# Patient Record
Sex: FEMALE | Race: WHITE | NOT HISPANIC OR LATINO | Employment: OTHER | URBAN - METROPOLITAN AREA
[De-identification: names, ages, dates, MRNs, and addresses within clinical notes are randomized per-mention and may not be internally consistent; named-entity substitution may affect disease eponyms.]

---

## 2017-05-16 ENCOUNTER — HOSPITAL ENCOUNTER (OUTPATIENT)
Facility: MEDICAL CENTER | Age: 42
End: 2017-05-16
Attending: FAMILY MEDICINE
Payer: COMMERCIAL

## 2017-05-16 ENCOUNTER — OFFICE VISIT (OUTPATIENT)
Dept: MEDICAL GROUP | Facility: CLINIC | Age: 42
End: 2017-05-16
Payer: COMMERCIAL

## 2017-05-16 VITALS
RESPIRATION RATE: 16 BRPM | DIASTOLIC BLOOD PRESSURE: 70 MMHG | TEMPERATURE: 97.9 F | SYSTOLIC BLOOD PRESSURE: 104 MMHG | BODY MASS INDEX: 23.19 KG/M2 | WEIGHT: 126 LBS | HEART RATE: 60 BPM | HEIGHT: 62 IN | OXYGEN SATURATION: 94 %

## 2017-05-16 DIAGNOSIS — Z01.419 WELL WOMAN EXAM WITH ROUTINE GYNECOLOGICAL EXAM: ICD-10-CM

## 2017-05-16 DIAGNOSIS — Z00.00 LABORATORY EXAMINATION ORDERED AS PART OF A ROUTINE GENERAL MEDICAL EXAMINATION: ICD-10-CM

## 2017-05-16 DIAGNOSIS — Z12.39 SCREENING BREAST EXAMINATION: ICD-10-CM

## 2017-05-16 PROCEDURE — 99000 SPECIMEN HANDLING OFFICE-LAB: CPT | Performed by: FAMILY MEDICINE

## 2017-05-16 PROCEDURE — 88175 CYTOPATH C/V AUTO FLUID REDO: CPT

## 2017-05-16 PROCEDURE — 87624 HPV HI-RISK TYP POOLED RSLT: CPT

## 2017-05-16 PROCEDURE — 99396 PREV VISIT EST AGE 40-64: CPT | Performed by: FAMILY MEDICINE

## 2017-05-16 ASSESSMENT — ENCOUNTER SYMPTOMS
BACK PAIN: 0
INSOMNIA: 1
ABDOMINAL PAIN: 0
SENSORY CHANGE: 0
NERVOUS/ANXIOUS: 1
SEIZURES: 0
HEADACHES: 0
BLURRED VISION: 0
HALLUCINATIONS: 0
FOCAL WEAKNESS: 0
PALPITATIONS: 0
SHORTNESS OF BREATH: 0
VOMITING: 0
HEMOPTYSIS: 0
BRUISES/BLEEDS EASILY: 0
DOUBLE VISION: 0
HEARTBURN: 0
MYALGIAS: 0
ORTHOPNEA: 0
NECK PAIN: 0
LOSS OF CONSCIOUSNESS: 0
CLAUDICATION: 0
NAUSEA: 0
BLOOD IN STOOL: 0
DIZZINESS: 0
DEPRESSION: 0
WHEEZING: 0
COUGH: 0
MEMORY LOSS: 0
TINGLING: 0
TREMORS: 0
PND: 0
SPEECH CHANGE: 0
EYE PAIN: 0

## 2017-05-16 ASSESSMENT — PATIENT HEALTH QUESTIONNAIRE - PHQ9: CLINICAL INTERPRETATION OF PHQ2 SCORE: 0

## 2017-05-16 ASSESSMENT — LIFESTYLE VARIABLES: SUBSTANCE_ABUSE: 0

## 2017-05-16 NOTE — PROGRESS NOTES
Subjective:      Mary Jane Hoffman is a 41 y.o. female who presents with Gynecologic Exam        She is here for her well woman examination with pap.  She has generally been well.    Gynecologic Exam  Pertinent negatives include no abdominal pain, back pain, dysuria, frequency, headaches, joint pain, nausea, rash, urgency or vomiting.    has a past medical history of Restless leg syndrome and Vertigo. She also has no past medical history of Clotting disorder (CMS-HCC) or Diabetes.   has past surgical history that includes other; tubal ligation; mammoplasty augmentation; and enlarge breast with implant.  family history includes Cancer in her father and mother.   reports that she has never smoked. She has never used smokeless tobacco. She reports that she drinks about 1.2 - 1.8 oz of alcohol per week. She reports that she does not use illicit drugs.      Current outpatient prescriptions:   •  fluticasone (FLONASE) 50 MCG/ACT nasal spray, Spray 1 Spray in nose every day., Disp: 16 g, Rfl: 11  •  ibuprofen (MOTRIN) 600 MG Tab, Take 600 mg by mouth every 6 hours as needed. AS NEEDED FOR MODERATE PAIN, Disp: , Rfl: 0  •  Multiple Vitamin (MULTIVITAMIN PO), Take 1 Tab by mouth every day., Disp: , Rfl:   •  B Complex-C (SUPER B COMPLEX PO), Take 1 Tbsp by mouth every day., Disp: , Rfl:   •  Cholecalciferol (VITAMIN D3) 1000 UNIT TABS, Take 5,000 mg by mouth every day., Disp: , Rfl:   •  Multiple Minerals (MINERAL COMPLEX PO), Take 1 Tab by mouth every day., Disp: , Rfl:   is allergic to pcn.      Review of Systems   Constitutional: Negative for malaise/fatigue.   HENT: Positive for congestion. Negative for hearing loss.    Eyes: Negative for blurred vision, double vision and pain.   Respiratory: Negative for cough, hemoptysis, shortness of breath and wheezing.    Cardiovascular: Negative for chest pain, palpitations, orthopnea, claudication and PND.   Gastrointestinal: Negative for heartburn, nausea, vomiting, abdominal pain  "and blood in stool.   Genitourinary: Negative for dysuria, urgency and frequency.   Musculoskeletal: Negative for myalgias, back pain, joint pain and neck pain.   Skin: Negative for rash.   Neurological: Negative for dizziness, tingling, tremors, sensory change, speech change, focal weakness, seizures, loss of consciousness and headaches.        Restless leg syndrome, intermittent   Endo/Heme/Allergies: Positive for environmental allergies. Does not bruise/bleed easily.   Psychiatric/Behavioral: Negative for depression, suicidal ideas, hallucinations, memory loss and substance abuse. The patient is nervous/anxious and has insomnia.           Objective:     /70 mmHg  Pulse 60  Temp(Src) 36.6 °C (97.9 °F)  Resp 16  Ht 1.575 m (5' 2.01\")  Wt 57.153 kg (126 lb)  BMI 23.04 kg/m2  SpO2 94%  LMP 05/08/2017     Physical Exam   Constitutional: She is oriented to person, place, and time. She appears well-developed and well-nourished. No distress.   HENT:   Head: Normocephalic and atraumatic.   Mouth/Throat: Oropharynx is clear and moist.   Eyes: EOM are normal. Pupils are equal, round, and reactive to light. Left eye exhibits no discharge.   Neck: Normal range of motion. Neck supple. No JVD present. No thyromegaly present.   Cardiovascular: Normal rate, regular rhythm and normal heart sounds.    No murmur heard.  Pulmonary/Chest: Effort normal and breath sounds normal. No respiratory distress. She has no wheezes. She has no rales. She exhibits no edema, no deformity and no retraction. Right breast exhibits no inverted nipple, no mass, no nipple discharge, no skin change and no tenderness. Left breast exhibits no inverted nipple, no mass, no nipple discharge, no skin change and no tenderness. Breasts are symmetrical.   Implants present, no hardening or folding noted   Abdominal: Soft. Bowel sounds are normal. She exhibits no distension and no mass. There is no tenderness.   Genitourinary: Vagina normal and uterus " normal. No breast tenderness, discharge or bleeding. Cervix exhibits no discharge. Right adnexum displays no mass. Left adnexum displays no mass. No vaginal discharge found.   Pap smear (brush and spatula) taken and sent   Musculoskeletal: Normal range of motion. She exhibits no edema.   Lymphadenopathy:     She has no cervical adenopathy.   Neurological: She is alert and oriented to person, place, and time. Coordination normal.   Skin: Skin is warm and dry. No rash noted. No erythema.   Psychiatric: She has a normal mood and affect. Her behavior is normal.   Vitals reviewed.              Assessment/Plan:     1. Well woman exam with routine gynecological exam  Await pap result.  She is generally very well.  - THINPREP PAP WITH HPV; Future    2. Laboratory examination ordered as part of a routine general medical examination  Routine orders discussed and placed  - COMP METABOLIC PANEL; Future  - LIPID PROFILE; Future  - CBC WITHOUT DIFFERENTIAL; Future    3. Screening breast examination  Mammogram recommended  - MA-SCREEN MAMMO W/IMPLANTS W/CAD-BILAT; Future

## 2017-05-17 LAB
CYTOLOGY REG CYTOL: NORMAL
HPV HR 12 DNA CVX QL NAA+PROBE: NEGATIVE
HPV16 DNA SPEC QL NAA+PROBE: NEGATIVE
HPV18 DNA SPEC QL NAA+PROBE: NEGATIVE
SPECIMEN SOURCE: NORMAL

## 2017-06-23 ENCOUNTER — PATIENT MESSAGE (OUTPATIENT)
Dept: MEDICAL GROUP | Facility: CLINIC | Age: 42
End: 2017-06-23

## 2017-07-08 ENCOUNTER — PATIENT MESSAGE (OUTPATIENT)
Dept: MEDICAL GROUP | Facility: CLINIC | Age: 42
End: 2017-07-08

## 2017-07-11 ENCOUNTER — PATIENT MESSAGE (OUTPATIENT)
Dept: MEDICAL GROUP | Facility: CLINIC | Age: 42
End: 2017-07-11

## 2017-07-11 DIAGNOSIS — G89.29 CHRONIC PAIN OF LEFT KNEE: ICD-10-CM

## 2017-07-11 DIAGNOSIS — M25.562 CHRONIC PAIN OF LEFT KNEE: ICD-10-CM

## 2017-07-11 NOTE — TELEPHONE ENCOUNTER
From: Mary Jane Hoffman  To: Karla Peoples M.D.  Sent: 7/11/2017 6:52 AM PDT  Subject: RE:MRI order    Hello, my left knee. Thank you!  ----- Message -----  From: Karla Peoples M.D.  Sent: 7/10/2017 11:35 AM PDT  To: Mary Jane Hoffman  Subject: MRI order    I can place that order for you. Which knee is bothering you?

## 2017-10-24 ENCOUNTER — OFFICE VISIT (OUTPATIENT)
Dept: URGENT CARE | Facility: CLINIC | Age: 42
End: 2017-10-24
Payer: COMMERCIAL

## 2017-10-24 VITALS
DIASTOLIC BLOOD PRESSURE: 70 MMHG | WEIGHT: 133 LBS | HEART RATE: 86 BPM | BODY MASS INDEX: 23.57 KG/M2 | OXYGEN SATURATION: 98 % | TEMPERATURE: 98.6 F | SYSTOLIC BLOOD PRESSURE: 110 MMHG | RESPIRATION RATE: 20 BRPM | HEIGHT: 63 IN

## 2017-10-24 DIAGNOSIS — J06.9 VIRAL URI WITH COUGH: ICD-10-CM

## 2017-10-24 PROCEDURE — 99213 OFFICE O/P EST LOW 20 MIN: CPT | Performed by: NURSE PRACTITIONER

## 2017-10-24 RX ORDER — IBUPROFEN 800 MG/1
800 TABLET ORAL EVERY 8 HOURS PRN
Qty: 90 TAB | Refills: 1 | Status: SHIPPED | OUTPATIENT
Start: 2017-10-24 | End: 2019-04-01

## 2017-10-24 ASSESSMENT — ENCOUNTER SYMPTOMS
WHEEZING: 0
COUGH: 1
SPUTUM PRODUCTION: 1
DIARRHEA: 0
NAUSEA: 0
MYALGIAS: 1
FEVER: 0
CHILLS: 0
SORE THROAT: 0
HEADACHES: 1
ORTHOPNEA: 0
SHORTNESS OF BREATH: 0
EYE DISCHARGE: 0

## 2017-10-24 NOTE — PROGRESS NOTES
"Subjective:      Mary Jane Hoffman is a 41 y.o. female who presents with Nasal Congestion; Cough; Generalized Body Aches; and Fever            HPI New problem. 41 year old with nasal congestion, cough and body aches for 2 days. She states cough is productive with chest tightness. She denies shortness of breath or wheezing. States has fever (subjective) and sweats. She has taken OTC medication for this with no relief.  Allergies, medications and history reviewed by me today      Review of Systems   Constitutional: Positive for malaise/fatigue. Negative for chills and fever.   HENT: Positive for congestion. Negative for sore throat.    Eyes: Negative for discharge.   Respiratory: Positive for cough and sputum production. Negative for shortness of breath and wheezing.    Cardiovascular: Negative for chest pain and orthopnea.   Gastrointestinal: Negative for diarrhea and nausea.   Musculoskeletal: Positive for myalgias.   Neurological: Positive for headaches.   Endo/Heme/Allergies: Negative for environmental allergies.          Objective:     /70   Pulse 86   Temp 37 °C (98.6 °F)   Resp 20   Ht 1.6 m (5' 3\")   Wt 60.3 kg (133 lb)   SpO2 98%   BMI 23.56 kg/m²      Physical Exam   Constitutional: She is oriented to person, place, and time. She appears well-developed and well-nourished. No distress.   HENT:   Head: Normocephalic and atraumatic.   Right Ear: External ear and ear canal normal. Tympanic membrane is not injected and not perforated. No middle ear effusion.   Left Ear: External ear and ear canal normal. Tympanic membrane is not injected and not perforated.  No middle ear effusion.   Nose: Mucosal edema present.   Mouth/Throat: Posterior oropharyngeal erythema present. No oropharyngeal exudate.   Eyes: Conjunctivae are normal. Right eye exhibits no discharge. Left eye exhibits no discharge.   Neck: Normal range of motion. Neck supple.   Cardiovascular: Normal rate, regular rhythm and normal heart " sounds.    No murmur heard.  Pulmonary/Chest: Effort normal and breath sounds normal. No respiratory distress.   Musculoskeletal: Normal range of motion.   Normal movement of all 4 extremities.   Lymphadenopathy:     She has no cervical adenopathy.        Right: No supraclavicular adenopathy present.        Left: No supraclavicular adenopathy present.   Neurological: She is alert and oriented to person, place, and time. Gait normal.   Skin: Skin is warm and dry.   Psychiatric: She has a normal mood and affect. Her behavior is normal. Thought content normal.               Assessment/Plan:     1. Viral URI with cough  ibuprofen (MOTRIN) 800 MG Tab     Viral illness at this time with no indication for antibiotics. Reviewed with patient expected course of illness and also reviewed OTC medications that may be used for symptom relief. Follow up 7-10 days if not improving.  a

## 2018-01-27 ENCOUNTER — OFFICE VISIT (OUTPATIENT)
Dept: URGENT CARE | Facility: CLINIC | Age: 43
End: 2018-01-27
Payer: COMMERCIAL

## 2018-01-27 VITALS
HEIGHT: 62 IN | OXYGEN SATURATION: 99 % | WEIGHT: 135.4 LBS | HEART RATE: 68 BPM | DIASTOLIC BLOOD PRESSURE: 60 MMHG | RESPIRATION RATE: 14 BRPM | TEMPERATURE: 98.5 F | BODY MASS INDEX: 24.92 KG/M2 | SYSTOLIC BLOOD PRESSURE: 100 MMHG

## 2018-01-27 DIAGNOSIS — R68.89 FLU-LIKE SYMPTOMS: ICD-10-CM

## 2018-01-27 DIAGNOSIS — R05.8 NON-PRODUCTIVE COUGH: ICD-10-CM

## 2018-01-27 LAB
FLUAV+FLUBV AG SPEC QL IA: NEGATIVE
INT CON NEG: NORMAL
INT CON POS: NORMAL

## 2018-01-27 PROCEDURE — 99214 OFFICE O/P EST MOD 30 MIN: CPT | Performed by: PHYSICIAN ASSISTANT

## 2018-01-27 PROCEDURE — 87804 INFLUENZA ASSAY W/OPTIC: CPT | Performed by: PHYSICIAN ASSISTANT

## 2018-01-27 RX ORDER — OSELTAMIVIR PHOSPHATE 75 MG/1
75 CAPSULE ORAL 2 TIMES DAILY
Qty: 10 CAP | Refills: 0 | Status: SHIPPED | OUTPATIENT
Start: 2018-01-27 | End: 2018-02-01

## 2018-01-27 RX ORDER — PROMETHAZINE HYDROCHLORIDE AND CODEINE PHOSPHATE 6.25; 1 MG/5ML; MG/5ML
5-10 SYRUP ORAL 3 TIMES DAILY PRN
Qty: 150 ML | Refills: 0 | Status: SHIPPED | OUTPATIENT
Start: 2018-01-27 | End: 2018-02-01

## 2018-01-27 RX ORDER — AZITHROMYCIN 250 MG/1
TABLET, FILM COATED ORAL
Qty: 6 TAB | Refills: 1 | Status: SHIPPED | OUTPATIENT
Start: 2018-01-27 | End: 2019-04-01

## 2018-01-27 ASSESSMENT — ENCOUNTER SYMPTOMS
COUGH: 1
EYES NEGATIVE: 1
SHORTNESS OF BREATH: 0
FEVER: 0
SPUTUM PRODUCTION: 0
CARDIOVASCULAR NEGATIVE: 1
CONSTITUTIONAL NEGATIVE: 1
SORE THROAT: 0
MYALGIAS: 1

## 2018-01-27 NOTE — PROGRESS NOTES
"Subjective:      Mary Jane Hoffman is a 42 y.o. female who presents with Cough (e0iclyp, cough, body aches, itchy throat)            Cough   This is a new (cough, flu sx) problem. The current episode started in the past 7 days. The problem has been unchanged. The problem occurs every few minutes. The cough is non-productive. Associated symptoms include myalgias. Pertinent negatives include no fever, sore throat or shortness of breath. Nothing aggravates the symptoms. She has tried nothing for the symptoms. The treatment provided no relief. There is no history of asthma or environmental allergies.       Review of Systems   Constitutional: Negative.  Negative for fever.   HENT: Negative.  Negative for sore throat.    Eyes: Negative.    Respiratory: Positive for cough. Negative for sputum production and shortness of breath.    Cardiovascular: Negative.    Musculoskeletal: Positive for myalgias.   Skin: Negative.    Endo/Heme/Allergies: Negative for environmental allergies.          Objective:     /60   Pulse 68   Temp 36.9 °C (98.5 °F)   Resp 14   Ht 1.575 m (5' 2\")   Wt 61.4 kg (135 lb 6.4 oz)   SpO2 99%   BMI 24.76 kg/m²      Physical Exam   Constitutional: She is oriented to person, place, and time. She appears well-developed and well-nourished. No distress.   HENT:   Head: Normocephalic and atraumatic.   Mouth/Throat: Oropharynx is clear and moist.   Eyes: EOM are normal. Pupils are equal, round, and reactive to light.   Neck: Normal range of motion. Neck supple.   Cardiovascular: Normal rate.    Pulmonary/Chest: Effort normal and breath sounds normal. No respiratory distress. She has no wheezes. She has no rales.   Lymphadenopathy:     She has cervical adenopathy.   Neurological: She is alert and oriented to person, place, and time.   Skin: Skin is warm and dry.   Psychiatric: She has a normal mood and affect. Her behavior is normal.   Nursing note and vitals reviewed.    Vitals:    01/27/18 1020   BP: " "100/60   Pulse: 68   Resp: 14   Temp: 36.9 °C (98.5 °F)   SpO2: 99%   Weight: 61.4 kg (135 lb 6.4 oz)   Height: 1.575 m (5' 2\")     Active Ambulatory Problems     Diagnosis Date Noted   • Restless leg syndrome    • Vertigo 09/10/2015   • Chronic pain of left knee 07/11/2017     Resolved Ambulatory Problems     Diagnosis Date Noted   • Panic disorder without agoraphobia      Past Medical History:   Diagnosis Date   • History of HPV infection    • Restless leg syndrome    • Vertigo      Current Outpatient Prescriptions on File Prior to Visit   Medication Sig Dispense Refill   • ibuprofen (MOTRIN) 800 MG Tab Take 1 Tab by mouth every 8 hours as needed. 90 Tab 1   • fluticasone (FLONASE) 50 MCG/ACT nasal spray Spray 1 Spray in nose every day. 16 g 11   • ibuprofen (MOTRIN) 600 MG Tab Take 600 mg by mouth every 6 hours as needed. AS NEEDED FOR MODERATE PAIN  0   • Multiple Vitamin (MULTIVITAMIN PO) Take 1 Tab by mouth every day.     • B Complex-C (SUPER B COMPLEX PO) Take 1 Tbsp by mouth every day.     • Cholecalciferol (VITAMIN D3) 1000 UNIT TABS Take 5,000 mg by mouth every day.     • Multiple Minerals (MINERAL COMPLEX PO) Take 1 Tab by mouth every day.       No current facility-administered medications on file prior to visit.      Family History   Problem Relation Age of Onset   • Cancer Mother      colon polyps,cervical   • Cancer Father      lymphoma     Family History   Problem Relation Age of Onset   • Cancer Mother      colon polyps,cervical   • Cancer Father      lymphoma     Pcn [penicillins]  Social History     Social History   • Marital status:      Spouse name: N/A   • Number of children: N/A   • Years of education: N/A     Occupational History   • Not on file.     Social History Main Topics   • Smoking status: Never Smoker   • Smokeless tobacco: Never Used   • Alcohol use 1.2 - 1.8 oz/week     2 - 3 Glasses of wine per week      Comment: social   • Drug use: No   • Sexual activity: Yes     Partners: " Male      Comment: ; 3 kids; massage therapy     Other Topics Concern   • Not on file     Social History Narrative   • No narrative on file             Flu ng     Assessment/Plan:     ·  flu sx, cough      · rx tamiflu [zpak prn cough persists]

## 2018-01-27 NOTE — LETTER
January 27, 2018         Patient: Mary Jane Hoffman   YOB: 1975   Date of Visit: 1/27/2018           To Whom it May Concern:    Mary Jane Hoffman was seen in my clinic on 1/27/2018 for illness; please excuse Saturday, Sunday.     If you have any questions or concerns, please don't hesitate to call.        Sincerely,           Agustín Holland P.A.-C.  Electronically Signed

## 2018-02-09 ENCOUNTER — APPOINTMENT (OUTPATIENT)
Dept: RADIOLOGY | Facility: MEDICAL CENTER | Age: 43
End: 2018-02-09
Attending: FAMILY MEDICINE
Payer: COMMERCIAL

## 2018-02-16 ENCOUNTER — HOSPITAL ENCOUNTER (OUTPATIENT)
Dept: RADIOLOGY | Facility: MEDICAL CENTER | Age: 43
End: 2018-02-16
Attending: FAMILY MEDICINE
Payer: COMMERCIAL

## 2018-02-16 DIAGNOSIS — Z12.31 VISIT FOR SCREENING MAMMOGRAM: ICD-10-CM

## 2018-02-16 PROCEDURE — 77067 SCR MAMMO BI INCL CAD: CPT

## 2018-03-23 ENCOUNTER — PATIENT MESSAGE (OUTPATIENT)
Dept: MEDICAL GROUP | Facility: CLINIC | Age: 43
End: 2018-03-23

## 2018-03-23 DIAGNOSIS — S99.929A: ICD-10-CM

## 2018-03-23 RX ORDER — IBUPROFEN 800 MG/1
800 TABLET ORAL EVERY 8 HOURS PRN
Qty: 30 TAB | Refills: 0 | Status: SHIPPED | OUTPATIENT
Start: 2018-03-23 | End: 2019-04-01

## 2018-09-10 ENCOUNTER — PATIENT MESSAGE (OUTPATIENT)
Dept: MEDICAL GROUP | Facility: CLINIC | Age: 43
End: 2018-09-10

## 2018-09-10 DIAGNOSIS — H10.31 ACUTE BACTERIAL CONJUNCTIVITIS OF RIGHT EYE: ICD-10-CM

## 2018-09-10 RX ORDER — SULFACETAMIDE SODIUM 100 MG/ML
1 SOLUTION/ DROPS OPHTHALMIC
Qty: 1 BOTTLE | Refills: 0 | Status: SHIPPED | OUTPATIENT
Start: 2018-09-10 | End: 2019-04-01

## 2019-04-01 ENCOUNTER — OFFICE VISIT (OUTPATIENT)
Dept: MEDICAL GROUP | Facility: MEDICAL CENTER | Age: 44
End: 2019-04-01

## 2019-04-01 VITALS
WEIGHT: 128 LBS | BODY MASS INDEX: 22.68 KG/M2 | SYSTOLIC BLOOD PRESSURE: 124 MMHG | DIASTOLIC BLOOD PRESSURE: 76 MMHG | OXYGEN SATURATION: 98 % | RESPIRATION RATE: 16 BRPM | HEIGHT: 63 IN | HEART RATE: 74 BPM | TEMPERATURE: 98.6 F

## 2019-04-01 DIAGNOSIS — F40.243 FLYING PHOBIA: ICD-10-CM

## 2019-04-01 DIAGNOSIS — Z12.31 ENCOUNTER FOR SCREENING MAMMOGRAM FOR MALIGNANT NEOPLASM OF BREAST: ICD-10-CM

## 2019-04-01 PROCEDURE — 99212 OFFICE O/P EST SF 10 MIN: CPT | Performed by: FAMILY MEDICINE

## 2019-04-01 RX ORDER — DIAZEPAM 5 MG/1
5 TABLET ORAL EVERY 12 HOURS PRN
Qty: 10 TAB | Refills: 0 | Status: SHIPPED | OUTPATIENT
Start: 2019-04-01 | End: 2020-09-22 | Stop reason: SDUPTHER

## 2019-04-01 ASSESSMENT — PATIENT HEALTH QUESTIONNAIRE - PHQ9: CLINICAL INTERPRETATION OF PHQ2 SCORE: 0

## 2019-04-01 NOTE — PROGRESS NOTES
"cc: Flying phobia    Subjective:     Mary Jane Hoffman is a 43 y.o. female presenting to discuss flying phobia and for a full physical exam.  She does not have health insurance.  She reports years of a phobia of flying.  Short flights are okay, but becomes quite anxious and irritable on longer flights.  She reports using Xanax about 4 years ago, but this seemed to give her vertigo.  Is wondering if she can try Valium.  She has a flight to [place] in [place] next week.  She rarely drinks alcohol.    Opioid Risk Score: 2    Interpretation of Opioid Risk Score   Score 0-3 = Low risk of abuse. Do UDS at least once per year.  Score 4-7 = Moderate risk of abuse. Do UDS 1-4 times per year.  Score 8+ = High risk of abuse. Refer to specialist.        Review of systems:  See above.       Current Outpatient Prescriptions:   •  diazePAM (VALIUM) 5 MG Tab, Take 1 Tab by mouth every 12 hours as needed for Anxiety (flying phobia) for up to 5 days., Disp: 10 Tab, Rfl: 0  •  Cholecalciferol (VITAMIN D3) 1000 UNIT TABS, Take 5,000 mg by mouth every day., Disp: , Rfl:     Allergies, past medical history, past surgical history, family history, social history reviewed and updated    Objective:     Vitals: /76 (BP Location: Right arm, Patient Position: Sitting)   Pulse 74   Temp 37 °C (98.6 °F) (Temporal)   Resp 16   Ht 1.6 m (5' 3\")   Wt 58.1 kg (128 lb)   LMP 03/04/2019   SpO2 98%   BMI 22.67 kg/m²   General: Alert, pleasant, NAD  HEENT: Normocephalic.  PERRL, EOMI, no icterus or pallor.  Conjunctivae and lids normal. External ears normal.Tympanic membranes pearly, opaque.  Oropharynx non-erythematous, mucous membranes moist.  Neck supple.  No thyromegaly or masses palpated. No cervical or supraclavicular lymphadenopathy.  Heart: Regular rate and rhythm.  S1 and S2 normal.  No murmurs appreciated.  Respiratory: Normal respiratory effort.  Clear to auscultation bilaterally.  Abdomen: Non-distended, soft  Skin: Warm, dry, " no rashes.  Musculoskeletal: Gait is normal.  Moves all extremities well.  Extremities: No leg edema.    Neurological: No tremors, sensation grossly intact, patellar reflexes 2+ symmetric, tone/strength normal  Psych:  Affect/mood is normal, judgement is good, memory is intact, grooming is appropriate.      Assessment/Plan:     Diagnoses and all orders for this visit:    Flying phobia  New problem.  I have reviewed the medical records and have determined that a controlled substance treatment is medically indicated.  Alternatives considered and discussed with patient.  Discussed risks of valium use.  ORT is 2.  NV and CA  checked, appropriate.  Script for 5 days given  -     diazePAM (VALIUM) 5 MG Tab; Take 1 Tab by mouth every 12 hours as needed for Anxiety (flying phobia) for up to 5 days. #10    Encounter for screening mammogram for malignant neoplasm of breast  -     MA-SCREEN MAMMO W/IMPLANTS W/CAD-BILAT; Future    We also discussed that she is not necessarily due for a full physical.  Her last Pap was 5/2017.  Recommended that she repeat it in 2020 or 2022 (as she does not have insurance today).  I reviewed other preventative health recommendations, she is only due for a mammogram.

## 2019-06-10 DIAGNOSIS — K59.09 OTHER CONSTIPATION: ICD-10-CM

## 2019-06-10 RX ORDER — LACTULOSE 10 G/15ML
20 SOLUTION ORAL 2 TIMES DAILY PRN
Qty: 1 BOTTLE | Refills: 0 | Status: SHIPPED | OUTPATIENT
Start: 2019-06-10 | End: 2020-09-22

## 2020-03-05 DIAGNOSIS — S99.929A: ICD-10-CM

## 2020-03-05 RX ORDER — IBUPROFEN 800 MG/1
800 TABLET ORAL EVERY 8 HOURS PRN
Qty: 30 TAB | Refills: 0 | Status: SHIPPED | OUTPATIENT
Start: 2020-03-05 | End: 2020-04-30 | Stop reason: SDUPTHER

## 2020-03-06 NOTE — TELEPHONE ENCOUNTER
VOICEMAIL  1. Caller Name: Mary Jane                      Call Back Number: [phone] (home) [phone] (work)    2. Message: Requesting refill of ibuprofen to [facility] [address] for gold elbow.     3. Patient approves office to leave a detailed voicemail/MyChart message: Yes

## 2020-04-30 DIAGNOSIS — S99.929A: ICD-10-CM

## 2020-04-30 RX ORDER — IBUPROFEN 800 MG/1
800 TABLET ORAL EVERY 8 HOURS PRN
Qty: 60 TAB | Refills: 2 | Status: SHIPPED | OUTPATIENT
Start: 2020-04-30 | End: 2020-09-22

## 2020-08-17 ENCOUNTER — TELEPHONE (OUTPATIENT)
Dept: SCHEDULING | Facility: IMAGING CENTER | Age: 45
End: 2020-08-17

## 2020-08-17 ENCOUNTER — HOSPITAL ENCOUNTER (OUTPATIENT)
Dept: LAB | Facility: MEDICAL CENTER | Age: 45
End: 2020-08-17
Attending: FAMILY MEDICINE

## 2020-08-17 DIAGNOSIS — Z86.16 HISTORY OF 2019 NOVEL CORONAVIRUS DISEASE (COVID-19): ICD-10-CM

## 2020-08-17 LAB
COVID ORDER STATUS COVID19: NORMAL
SARS-COV-2 RNA RESP QL NAA+PROBE: NOTDETECTED
SPECIMEN SOURCE: NORMAL

## 2020-08-17 PROCEDURE — U0003 INFECTIOUS AGENT DETECTION BY NUCLEIC ACID (DNA OR RNA); SEVERE ACUTE RESPIRATORY SYNDROME CORONAVIRUS 2 (SARS-COV-2) (CORONAVIRUS DISEASE [COVID-19]), AMPLIFIED PROBE TECHNIQUE, MAKING USE OF HIGH THROUGHPUT TECHNOLOGIES AS DESCRIBED BY CMS-2020-01-R: HCPCS

## 2020-08-17 PROCEDURE — C9803 HOPD COVID-19 SPEC COLLECT: HCPCS

## 2020-08-17 NOTE — TELEPHONE ENCOUNTER
I have placed a covid test order with [facility].  She needs to call the main number [phone] to get an urgent care appointment to test.

## 2020-08-17 NOTE — TELEPHONE ENCOUNTER
Good Morning Dr. Peoples,     This patient called in today requesting lab orders to get COVID tested in order to go back to work. Can you please review and follow up with the patient once the labs are in.    Thank you.

## 2020-08-28 ENCOUNTER — HOSPITAL ENCOUNTER (EMERGENCY)
Facility: MEDICAL CENTER | Age: 45
End: 2020-08-28
Attending: EMERGENCY MEDICINE

## 2020-08-28 ENCOUNTER — APPOINTMENT (OUTPATIENT)
Dept: URGENT CARE | Facility: CLINIC | Age: 45
End: 2020-08-28

## 2020-08-28 VITALS
TEMPERATURE: 97.9 F | BODY MASS INDEX: 23.12 KG/M2 | HEIGHT: 62 IN | RESPIRATION RATE: 14 BRPM | WEIGHT: 125.66 LBS | OXYGEN SATURATION: 100 % | DIASTOLIC BLOOD PRESSURE: 70 MMHG | HEART RATE: 66 BPM | SYSTOLIC BLOOD PRESSURE: 103 MMHG

## 2020-08-28 DIAGNOSIS — M25.542 ARTHRALGIA OF BOTH HANDS: ICD-10-CM

## 2020-08-28 DIAGNOSIS — M25.541 ARTHRALGIA OF BOTH HANDS: ICD-10-CM

## 2020-08-28 PROCEDURE — 99281 EMR DPT VST MAYX REQ PHY/QHP: CPT

## 2020-08-28 NOTE — ED PROVIDER NOTES
ED Provider Note    Scribed for Zac Ortiz M.D. by George Santiago. 8/28/2020, 8:59 AM.    Primary care provider: Stephen Peoples M.D.  Means of arrival: Walk In  History obtained from: Patient  History limited by: None    CHIEF COMPLAINT  Chief Complaint   Patient presents with   • Hand Pain   • Joint Pain       HPI  Mary Jane Hoffman is a 44 y.o. female who presents to the Emergency Department for evaluation of bilateral hand and finger pain. She states that this morning she woke up and noticed that the joints in her hands and fingers were stiff, sore and her pain is worsened with movement. One hour later she noticed that her finger tips were starting to turn blue, and the soreness was radiating up through he hands and into her arms. She also endorses some slight tingling in the fingers, but denies any weakness or numbness. Patient tried to wash her hands to see if the blue in her fingers would wash or come off, but did not. Given this concern she went to urgent care for evaluation, however was sent here for a higher level of care. She otherwise denies having any fevers, chills, chest pain, or shortness of breath. 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    REVIEW OF SYSTEMS  See HPI for details.    PAST MEDICAL HISTORY   has a past medical history of History of HPV infection, Restless leg syndrome, and Vertigo.    SURGICAL HISTORY   has a past surgical history that includes other; tubal ligation; mammoplasty augmentation; and enlarge breast with implant.    SOCIAL HISTORY  Social History     Tobacco Use   • Smoking status: Never Smoker   • Smokeless tobacco: Never Used   Substance Use Topics   • Alcohol use: Yes     Alcohol/week: 1.2 - 1.8 oz     Types: 2 - 3 Glasses of wine per week     Comment: social   • Drug use: Yes     Comment: marijuana      Social History     Substance and Sexual Activity   Drug Use Yes    Comment: marijuana       FAMILY HISTORY  Family  "History   Problem Relation Age of Onset   • Cancer Mother         colon polyps,cervical   • Cancer Father         lymphoma       CURRENT MEDICATIONS  No current facility-administered medications on file prior to encounter.      Current Outpatient Medications on File Prior to Encounter   Medication Sig Dispense Refill   • ibuprofen (MOTRIN) 800 MG Tab Take 1 Tab by mouth every 8 hours as needed for Moderate Pain or Inflammation. 60 Tab 2   • lactulose 10 GM/15ML Solution Take 30 mL by mouth 2 times a day as needed (severe constipation). 1 Bottle 0   • Cholecalciferol (VITAMIN D3) 1000 UNIT TABS Take 5,000 mg by mouth every day.         ALLERGIES  Allergies   Allergen Reactions   • Pcn [Penicillins]        PHYSICAL EXAM  VITAL SIGNS: /70   Pulse 66   Temp 36.6 °C (97.9 °F) (Temporal)   Resp 14   Ht 1.575 m (5' 2\")   Wt 57 kg (125 lb 10.6 oz)   SpO2 100%   BMI 22.98 kg/m²     Constitutional: Well developed, Well nourished, No acute distress, Non-toxic appearance.   HENT: Normocephalic, Atraumatic, Bilateral external ears normal Nose normal.   Eyes: Conjunctiva is normal, there are no signs of exudate.   Neck: Supple, no meningeal signs.  Lymphatic: No lymphadenopathy noted.   Cardiovascular: Regular rate and rhythm without murmurs gallops or rubs.   Thorax & Lungs: No respiratory distress. Breathing comfortably. Chest wall is nontender.  Skin: Warm, Dry, No erythema, Both hands initially had a blue tinge to the palmar surface and finger tips, which was able to be removed with a scrubbing pad and chlorhexidine.  Musculoskeletal: Good capillary under two seconds to bilateral hands and fingers. Good range of motion in all major joints.  She does have a bluish tinge to her fingers that I used chlorhexidine wipes and actually wiped it off.  No tenderness to palpation or major deformities noted. Intact distal pulses, no clubbing, no cyanosis, no edema,   Neurologic: Alert & oriented x 3, Moving all extremities. " No gross abnormalities. Normal neurologic function to bilateral hands  Psychiatric: Affect normal, Judgment normal, Mood normal.     COURSE & MEDICAL DECISION MAKING  Pertinent Labs & Imaging studies reviewed. (See chart for details)    8:59 AM - Patient seen and examined at bedside. . Upon examination the blue tinge does not appear consistent with a cyanotic distribution. When scrubbing the patients hands and fingers using chlorhexidine and a scrubbing pad, a blue tinge was noticed to come off the hands and onto the pad, consistent with a staining of the fingers. Discussed with the patient that she has likely had contact with something that is staining her fingers blue. She is made aware her exam is reassuring without any vascular concerns, and I suspect that her soreness and stiffness is likely because of her increased work as a massage therapist. I answered any questions or concerns the patient had. She understands and is comfortable with discharge.        The patient will return for new or worsening symptoms and is stable at the time of discharge.    DISPOSITION:  Patient will be discharged home in stable condition.    FOLLOW UP:  Stephen Peoples M.D.  [address] [place] [id number]  [phone]    Schedule an appointment as soon as possible for a visit in 1 week  As needed, Return if any symptoms worsen    FINAL IMPRESSION  1. Arthralgia of both hands          George EDGAR (Cynthiaibjunior), am scribing for, and in the presence of, Zac Ortiz M.D..    Electronically signed by: George Santiago (Srini), 8/28/2020    Zac EDGAR M.D. personally performed the services described in this documentation, as scribed by George Santiago in my presence, and it is both accurate and complete. E.    The note accurately reflects work and decisions made by me.  Zac Ortiz M.D.  8/28/2020  3:24 PM

## 2020-08-28 NOTE — ED NOTES
Discharge orders received, instructions and education given, follow-up discussed, pt verbalized understanding. Ambulates out of department with steady, upright gait with significant other.

## 2020-08-28 NOTE — ED TRIAGE NOTES
Chief Complaint   Patient presents with   • Hand Pain   • Joint Pain     Reports she woke this morning with stiff joints in her hands and arms.  Pt also c/o blue finger tips that started this morning.  Triage process explained to patient.  Pt instructed to inform RN if any changes or questions arise.  Pt back to waiting room.

## 2020-08-28 NOTE — ED NOTES
Pt ambulatory to room 64 with steady, upright gait without difficulty. Pt instructed on pending ERP evaluation and POC, verbalizes understanding. Visitor at bedside, call light within reach. Will continue to monitor.

## 2020-09-22 ENCOUNTER — OFFICE VISIT (OUTPATIENT)
Dept: MEDICAL GROUP | Facility: MEDICAL CENTER | Age: 45
End: 2020-09-22

## 2020-09-22 VITALS
BODY MASS INDEX: 22.45 KG/M2 | RESPIRATION RATE: 14 BRPM | HEART RATE: 62 BPM | TEMPERATURE: 98.4 F | DIASTOLIC BLOOD PRESSURE: 64 MMHG | OXYGEN SATURATION: 100 % | SYSTOLIC BLOOD PRESSURE: 96 MMHG | HEIGHT: 62 IN | WEIGHT: 122 LBS

## 2020-09-22 DIAGNOSIS — F40.243 FLYING PHOBIA: ICD-10-CM

## 2020-09-22 DIAGNOSIS — Z12.39 SCREENING BREAST EXAMINATION: ICD-10-CM

## 2020-09-22 DIAGNOSIS — S99.929A: ICD-10-CM

## 2020-09-22 PROCEDURE — 99213 OFFICE O/P EST LOW 20 MIN: CPT | Performed by: FAMILY MEDICINE

## 2020-09-22 RX ORDER — IBUPROFEN 800 MG/1
800 TABLET ORAL EVERY 8 HOURS PRN
Qty: 60 TAB | Refills: 2 | Status: SHIPPED | OUTPATIENT
Start: 2020-09-22 | End: 2021-03-12 | Stop reason: SDUPTHER

## 2020-09-22 RX ORDER — DIAZEPAM 5 MG/1
5 TABLET ORAL EVERY 12 HOURS PRN
Qty: 10 TAB | Refills: 0 | Status: SHIPPED | OUTPATIENT
Start: 2020-09-22 | End: 2022-04-26 | Stop reason: SDUPTHER

## 2020-09-22 ASSESSMENT — PATIENT HEALTH QUESTIONNAIRE - PHQ9: CLINICAL INTERPRETATION OF PHQ2 SCORE: 0

## 2020-09-22 NOTE — PROGRESS NOTES
"Chief Complaint   Patient presents with   • Medication Refill       Subjective:     HPI:   Mary Jane Hoffman presents today with the followin. Flying phobia  Will be flying to see family.  The alprazolam has been helpful.  This is renewed.  PDMP review is without inconsistences.    2. Toe injury, unspecified laterality, initial encounter  Still taking ibuprofen but now more occasional.  Renewal is placed.    Mammogram order discussed and placed.  Her implants are out.  She is teaching yoga.    Patient Active Problem List    Diagnosis Date Noted   • Chronic pain of left knee 2017   • Vertigo 09/10/2015   • Restless leg syndrome        Current medicines (including changes today)  Current Outpatient Medications   Medication Sig Dispense Refill   • diazePAM (VALIUM) 5 MG Tab Take 1 Tab by mouth every 12 hours as needed for Anxiety (flying phobia) for up to 5 days. 10 Tab 0   • ibuprofen (MOTRIN) 800 MG Tab Take 1 Tab by mouth every 8 hours as needed for Moderate Pain or Inflammation. 60 Tab 2   • Cholecalciferol (VITAMIN D3) 1000 UNIT TABS Take 5,000 mg by mouth every day.       No current facility-administered medications for this visit.        Allergies   Allergen Reactions   • Pcn [Penicillins]        ROS: As per HPI       Objective:     BP (!) 96/64 (BP Location: Right arm, Patient Position: Sitting, BP Cuff Size: Adult)   Pulse 62   Temp 36.9 °C (98.4 °F) (Temporal)   Resp 14   Ht 1.575 m (5' 2\")   Wt 55.3 kg (122 lb)   SpO2 100%  Body mass index is 22.31 kg/m².    Physical Exam:  Constitutional: Well-developed and well-nourished. Not diaphoretic. No distress. Lucid and fluent.  Patient, physician and staff all wearing masks.  Skin: Skin is warm and dry. No rash noted.  Head: Atraumatic without lesions.  Eyes: Conjunctivae and extraocular motions are normal. Pupils are equal, round, and reactive to light. No scleral icterus.   Neck: Supple, trachea midline. No thyromegaly present. No cervical or " supraclavicular lymphadenopathy. No JVD or carotid bruits appreciated  Cardiovascular: Regular rate and rhythm.  Normal S1, S2 without murmur appreciated.  Chest: Effort normal. Clear to auscultation throughout. No adventitious sounds.   Extremities: No cyanosis, clubbing, erythema, nor edema.   Neurological: Alert and oriented x 3. No tremor appreciated.  Psychiatric:  Behavior, mood, and affect are appropriate.       Assessment and Plan:     44 y.o. female with the following issues:    1. Flying phobia  diazePAM (VALIUM) 5 MG Tab   2. Toe injury, unspecified laterality, initial encounter  ibuprofen (MOTRIN) 800 MG Tab   3. Screening breast examination  MA-SCREENING MAMMO BILAT W/TOMOSYNTHESIS W/CAD         Followup: Return if symptoms worsen or fail to improve.

## 2021-03-12 DIAGNOSIS — S99.929A: ICD-10-CM

## 2021-03-12 RX ORDER — IBUPROFEN 800 MG/1
800 TABLET ORAL EVERY 8 HOURS PRN
Qty: 60 TABLET | Refills: 2 | Status: SHIPPED | OUTPATIENT
Start: 2021-03-12 | End: 2022-04-26

## 2022-01-06 DIAGNOSIS — M79.641 RIGHT HAND PAIN: ICD-10-CM

## 2022-02-22 DIAGNOSIS — R21 FACIAL RASH: ICD-10-CM

## 2022-02-22 RX ORDER — METRONIDAZOLE 7.5 MG/G
GEL TOPICAL
Qty: 45 G | Refills: 0 | Status: SHIPPED | OUTPATIENT
Start: 2022-02-22 | End: 2022-04-26 | Stop reason: SDUPTHER

## 2022-02-22 NOTE — PROGRESS NOTES
Patient has facial erythema and rash which she associates with wearing the mask at work.  She does not yet have insurance.  I do not want to prescribe steroids for the face.  What I have been seeing quite a bit with other patients is rosacea associated with the heat from wearing masks.  I have sent a prescription for metronidazole gel to her pharmacy.

## 2022-04-22 DIAGNOSIS — N34.3 DYSURIA-FREQUENCY SYNDROME: ICD-10-CM

## 2022-04-22 RX ORDER — SULFAMETHOXAZOLE AND TRIMETHOPRIM 800; 160 MG/1; MG/1
1 TABLET ORAL 2 TIMES DAILY
Qty: 14 TABLET | Refills: 0 | Status: SHIPPED | OUTPATIENT
Start: 2022-04-22 | End: 2022-05-02

## 2022-04-23 SDOH — ECONOMIC STABILITY: HOUSING INSECURITY
IN THE LAST 12 MONTHS, WAS THERE A TIME WHEN YOU DID NOT HAVE A STEADY PLACE TO SLEEP OR SLEPT IN A SHELTER (INCLUDING NOW)?: NO

## 2022-04-23 SDOH — ECONOMIC STABILITY: HOUSING INSECURITY

## 2022-04-23 SDOH — ECONOMIC STABILITY: INCOME INSECURITY: IN THE LAST 12 MONTHS, WAS THERE A TIME WHEN YOU WERE NOT ABLE TO PAY THE MORTGAGE OR RENT ON TIME?: NO

## 2022-04-23 SDOH — ECONOMIC STABILITY: FOOD INSECURITY: WITHIN THE PAST 12 MONTHS, YOU WORRIED THAT YOUR FOOD WOULD RUN OUT BEFORE YOU GOT MONEY TO BUY MORE.: NEVER TRUE

## 2022-04-23 SDOH — ECONOMIC STABILITY: FOOD INSECURITY: WITHIN THE PAST 12 MONTHS, THE FOOD YOU BOUGHT JUST DIDN'T LAST AND YOU DIDN'T HAVE MONEY TO GET MORE.: NEVER TRUE

## 2022-04-23 SDOH — HEALTH STABILITY: MENTAL HEALTH
STRESS IS WHEN SOMEONE FEELS TENSE, NERVOUS, ANXIOUS, OR CAN'T SLEEP AT NIGHT BECAUSE THEIR MIND IS TROUBLED. HOW STRESSED ARE YOU?: TO SOME EXTENT

## 2022-04-23 SDOH — ECONOMIC STABILITY: TRANSPORTATION INSECURITY
IN THE PAST 12 MONTHS, HAS LACK OF TRANSPORTATION KEPT YOU FROM MEETINGS, WORK, OR FROM GETTING THINGS NEEDED FOR DAILY LIVING?: NO

## 2022-04-23 SDOH — HEALTH STABILITY: PHYSICAL HEALTH: ON AVERAGE, HOW MANY MINUTES DO YOU ENGAGE IN EXERCISE AT THIS LEVEL?: 30 MIN

## 2022-04-23 SDOH — ECONOMIC STABILITY: TRANSPORTATION INSECURITY
IN THE PAST 12 MONTHS, HAS THE LACK OF TRANSPORTATION KEPT YOU FROM MEDICAL APPOINTMENTS OR FROM GETTING MEDICATIONS?: NO

## 2022-04-23 SDOH — ECONOMIC STABILITY: INCOME INSECURITY: HOW HARD IS IT FOR YOU TO PAY FOR THE VERY BASICS LIKE FOOD, HOUSING, MEDICAL CARE, AND HEATING?: NOT VERY HARD

## 2022-04-23 SDOH — HEALTH STABILITY: PHYSICAL HEALTH: ON AVERAGE, HOW MANY DAYS PER WEEK DO YOU ENGAGE IN MODERATE TO STRENUOUS EXERCISE (LIKE A BRISK WALK)?: 4 DAYS

## 2022-04-23 SDOH — ECONOMIC STABILITY: TRANSPORTATION INSECURITY
IN THE PAST 12 MONTHS, HAS LACK OF RELIABLE TRANSPORTATION KEPT YOU FROM MEDICAL APPOINTMENTS, MEETINGS, WORK OR FROM GETTING THINGS NEEDED FOR DAILY LIVING?: NO

## 2022-04-23 ASSESSMENT — SOCIAL DETERMINANTS OF HEALTH (SDOH)
HOW OFTEN DO YOU ATTENT MEETINGS OF THE CLUB OR ORGANIZATION YOU BELONG TO?: NEVER
IN A TYPICAL WEEK, HOW MANY TIMES DO YOU TALK ON THE PHONE WITH FAMILY, FRIENDS, OR NEIGHBORS?: MORE THAN THREE TIMES A WEEK
HOW HARD IS IT FOR YOU TO PAY FOR THE VERY BASICS LIKE FOOD, HOUSING, MEDICAL CARE, AND HEATING?: NOT VERY HARD
DO YOU BELONG TO ANY CLUBS OR ORGANIZATIONS SUCH AS CHURCH GROUPS UNIONS, FRATERNAL OR ATHLETIC GROUPS, OR SCHOOL GROUPS?: NO
HOW OFTEN DO YOU GET TOGETHER WITH FRIENDS OR RELATIVES?: ONCE A WEEK
HOW MANY DRINKS CONTAINING ALCOHOL DO YOU HAVE ON A TYPICAL DAY WHEN YOU ARE DRINKING: 1 OR 2
DO YOU BELONG TO ANY CLUBS OR ORGANIZATIONS SUCH AS CHURCH GROUPS UNIONS, FRATERNAL OR ATHLETIC GROUPS, OR SCHOOL GROUPS?: NO
IN A TYPICAL WEEK, HOW MANY TIMES DO YOU TALK ON THE PHONE WITH FAMILY, FRIENDS, OR NEIGHBORS?: MORE THAN THREE TIMES A WEEK
HOW OFTEN DO YOU ATTENT MEETINGS OF THE CLUB OR ORGANIZATION YOU BELONG TO?: NEVER
WITHIN THE PAST 12 MONTHS, YOU WORRIED THAT YOUR FOOD WOULD RUN OUT BEFORE YOU GOT THE MONEY TO BUY MORE: NEVER TRUE
HOW OFTEN DO YOU ATTEND CHURCH OR RELIGIOUS SERVICES?: NEVER
HOW OFTEN DO YOU HAVE SIX OR MORE DRINKS ON ONE OCCASION: NEVER
HOW OFTEN DO YOU ATTEND CHURCH OR RELIGIOUS SERVICES?: NEVER
HOW OFTEN DO YOU HAVE A DRINK CONTAINING ALCOHOL: MONTHLY OR LESS
HOW OFTEN DO YOU GET TOGETHER WITH FRIENDS OR RELATIVES?: ONCE A WEEK

## 2022-04-23 ASSESSMENT — LIFESTYLE VARIABLES
HOW OFTEN DO YOU HAVE SIX OR MORE DRINKS ON ONE OCCASION: NEVER
HOW OFTEN DO YOU HAVE A DRINK CONTAINING ALCOHOL: MONTHLY OR LESS
HOW MANY STANDARD DRINKS CONTAINING ALCOHOL DO YOU HAVE ON A TYPICAL DAY: 1 OR 2

## 2022-04-26 ENCOUNTER — OFFICE VISIT (OUTPATIENT)
Dept: MEDICAL GROUP | Facility: MEDICAL CENTER | Age: 47
End: 2022-04-26

## 2022-04-26 VITALS
HEART RATE: 59 BPM | OXYGEN SATURATION: 97 % | DIASTOLIC BLOOD PRESSURE: 52 MMHG | WEIGHT: 126 LBS | BODY MASS INDEX: 23.19 KG/M2 | RESPIRATION RATE: 12 BRPM | SYSTOLIC BLOOD PRESSURE: 96 MMHG | HEIGHT: 62 IN | TEMPERATURE: 98 F

## 2022-04-26 DIAGNOSIS — R21 FACIAL RASH: ICD-10-CM

## 2022-04-26 DIAGNOSIS — Z12.39 SCREENING BREAST EXAMINATION: ICD-10-CM

## 2022-04-26 DIAGNOSIS — F40.243 FLYING PHOBIA: ICD-10-CM

## 2022-04-26 PROCEDURE — 99213 OFFICE O/P EST LOW 20 MIN: CPT | Performed by: FAMILY MEDICINE

## 2022-04-26 RX ORDER — METRONIDAZOLE 7.5 MG/G
GEL TOPICAL
Qty: 45 G | Refills: 0 | Status: SHIPPED | OUTPATIENT
Start: 2022-04-26 | End: 2022-08-22

## 2022-04-26 RX ORDER — DIAZEPAM 5 MG/1
5 TABLET ORAL EVERY 12 HOURS PRN
Qty: 10 TABLET | Refills: 0 | Status: SHIPPED | OUTPATIENT
Start: 2022-04-26 | End: 2022-05-01

## 2022-04-26 ASSESSMENT — PATIENT HEALTH QUESTIONNAIRE - PHQ9: CLINICAL INTERPRETATION OF PHQ2 SCORE: 0

## 2022-04-26 NOTE — PROGRESS NOTES
"Chief Complaint   Patient presents with   • Acne   • Medication Refill     Fear of flying       Subjective:     HPI:   Mary Jane Hoffman presents today with the followin. Facial rash  Patient has acne rosacea.  This has been responding well to the metronidazole gel.  However, whenever she stops the gel it recurs.  This has been a little bit worse wearing masks.  I have asked her to use the metronidazole gel regularly and not stop it.    2. Flying phobia  Patient does have a flying phobia and will be traveling soon.  Diazepam has been very helpful in the past.  PDMP review shows no inconsistencies her last diazepam fill was over a year and a half ago.  Risks and benefits discussed, patient understands    3. Screening breast examination  Mammogram order discussed        Patient Active Problem List    Diagnosis Date Noted   • Chronic pain of left knee 2017   • Vertigo 09/10/2015   • Restless leg syndrome        Current medicines (including changes today)  Current Outpatient Medications   Medication Sig Dispense Refill   • metronidazole (METROGEL) 0.75 % gel Apply to face sparingly twice daily 45 g 0   • diazePAM (VALIUM) 5 MG Tab Take 1 Tablet by mouth every 12 hours as needed for Anxiety (flying phobia) for up to 5 days. 10 Tablet 0   • sulfamethoxazole-trimethoprim (BACTRIM DS) 800-160 MG tablet Take 1 Tablet by mouth 2 times a day for 7 days. 14 Tablet 0     No current facility-administered medications for this visit.       Allergies   Allergen Reactions   • Pcn [Penicillins]        ROS: As per HPI       Objective:     BP (!) 96/52 (BP Location: Left arm, Patient Position: Sitting, BP Cuff Size: Adult)   Pulse (!) 59   Temp 36.7 °C (98 °F) (Temporal)   Resp 12   Ht 1.575 m (5' 2\")   Wt 57.2 kg (126 lb)   SpO2 97%  Body mass index is 23.05 kg/m².    Physical Exam:  Constitutional: Well-developed and well-nourished. Not diaphoretic. No distress. Lucid and fluent. Patient, physician and staff all " wearing masks.  Skin: Skin is warm and dry. Rosacea pustules.   Head: Atraumatic without lesions.  Eyes: Conjunctivae and extraocular motions are normal. Pupils are equal, round, and reactive to light. No scleral icterus.   Ears:  External ears unremarkable.   Neck: Supple, trachea midline. No thyromegaly present. No cervical or supraclavicular lymphadenopathy. No JVD or carotid bruits appreciated  Cardiovascular: Regular rate and rhythm.  Normal S1, S2 without murmur appreciated.  Chest: Effort normal. Clear to auscultation throughout. No adventitious sounds.   Extremities: No cyanosis, clubbing, erythema, nor edema.   Neurological: Alert and oriented x 3  Psychiatric:  Behavior, mood, and affect are appropriate.       Assessment and Plan:     46 y.o. female with the following issues:    1. Facial rash  metronidazole (METROGEL) 0.75 % gel   2. Flying phobia  diazePAM (VALIUM) 5 MG Tab   3. Screening breast examination  MA-SCREENING MAMMO BILAT W/TOMOSYNTHESIS W/CAD         Followup: Return if symptoms worsen or fail to improve.

## 2022-04-30 DIAGNOSIS — N34.3 DYSURIA-FREQUENCY SYNDROME: ICD-10-CM

## 2022-05-02 RX ORDER — SULFAMETHOXAZOLE AND TRIMETHOPRIM 800; 160 MG/1; MG/1
TABLET ORAL
Qty: 14 TABLET | Refills: 0 | Status: SHIPPED | OUTPATIENT
Start: 2022-05-02 | End: 2022-08-22

## 2022-06-29 DIAGNOSIS — R05.3 PERSISTENT COUGH FOR 3 WEEKS OR LONGER: ICD-10-CM

## 2022-07-05 DIAGNOSIS — K31.89 GASTRIC ACIDITY: ICD-10-CM

## 2022-07-05 RX ORDER — OMEPRAZOLE 40 MG/1
40 CAPSULE, DELAYED RELEASE ORAL DAILY
Qty: 30 CAPSULE | Refills: 0 | Status: SHIPPED | OUTPATIENT
Start: 2022-07-05 | End: 2022-08-02

## 2022-07-12 ENCOUNTER — APPOINTMENT (OUTPATIENT)
Dept: RADIOLOGY | Facility: MEDICAL CENTER | Age: 47
End: 2022-07-12
Attending: FAMILY MEDICINE

## 2022-07-21 DIAGNOSIS — R05.3 PERSISTENT COUGH FOR 3 WEEKS OR LONGER: ICD-10-CM

## 2022-07-21 DIAGNOSIS — R09.89 HYPERINFLATION OF LUNGS: ICD-10-CM

## 2022-07-21 RX ORDER — ALBUTEROL SULFATE 90 UG/1
2 AEROSOL, METERED RESPIRATORY (INHALATION) EVERY 6 HOURS PRN
Qty: 8.5 G | Refills: 1 | Status: SHIPPED | OUTPATIENT
Start: 2022-07-21 | End: 2022-07-27 | Stop reason: SDUPTHER

## 2022-07-27 DIAGNOSIS — R05.3 PERSISTENT COUGH FOR 3 WEEKS OR LONGER: ICD-10-CM

## 2022-07-27 DIAGNOSIS — R09.89 HYPERINFLATION OF LUNGS: ICD-10-CM

## 2022-07-27 RX ORDER — ALBUTEROL SULFATE 90 UG/1
2 AEROSOL, METERED RESPIRATORY (INHALATION) EVERY 6 HOURS PRN
Qty: 8.5 G | Refills: 1 | Status: SHIPPED | OUTPATIENT
Start: 2022-07-27 | End: 2023-01-30

## 2022-07-27 NOTE — TELEPHONE ENCOUNTER
Received request via: Patient    Was the patient seen in the last year in this department? Yes    Does the patient have an active prescription (recently filled or refills available) for medication(s) requested? No     Patient miss place inhaler.

## 2022-08-22 ENCOUNTER — APPOINTMENT (OUTPATIENT)
Dept: MEDICAL GROUP | Facility: MEDICAL CENTER | Age: 47
End: 2022-08-22
Payer: COMMERCIAL

## 2022-08-22 ENCOUNTER — OFFICE VISIT (OUTPATIENT)
Dept: URGENT CARE | Facility: CLINIC | Age: 47
End: 2022-08-22

## 2022-08-22 VITALS
WEIGHT: 130.8 LBS | OXYGEN SATURATION: 99 % | SYSTOLIC BLOOD PRESSURE: 106 MMHG | TEMPERATURE: 98 F | DIASTOLIC BLOOD PRESSURE: 70 MMHG | RESPIRATION RATE: 14 BRPM | HEART RATE: 90 BPM | HEIGHT: 63 IN | BODY MASS INDEX: 23.18 KG/M2

## 2022-08-22 DIAGNOSIS — J45.909 REACTIVE AIRWAY DISEASE WITHOUT COMPLICATION, UNSPECIFIED ASTHMA SEVERITY, UNSPECIFIED WHETHER PERSISTENT: ICD-10-CM

## 2022-08-22 DIAGNOSIS — K21.9 GASTROESOPHAGEAL REFLUX DISEASE, UNSPECIFIED WHETHER ESOPHAGITIS PRESENT: ICD-10-CM

## 2022-08-22 PROCEDURE — 99214 OFFICE O/P EST MOD 30 MIN: CPT | Performed by: NURSE PRACTITIONER

## 2022-08-22 NOTE — PROGRESS NOTES
Chief Complaint   Patient presents with    Asthma     After a virus and  S.O.B..        HISTORY OF PRESENT ILLNESS: Patient is a pleasant 46 y.o. female who presents to urgent care today with concerns of shortness of breath and epigastric pain.  Notes her symptoms started with epigastric pain in , the pain is intermittent.  Over the past 1 to 2 months she also developed wheezing and shortness of breath.  She does have a history of childhood asthma.  She was seen by her PCP for her symptoms.  An x-ray was performed without any significant findings.  She was placed on both albuterol and omeprazole.  She stopped taking omeprazole as she felt bloated with the medication.  She does use albuterol with some improvement but not full resolve of her respiratory complaints.  She has tried taking a natural supplement for her breathing which seems to help for short amount of time.    Patient Active Problem List    Diagnosis Date Noted    Chronic pain of left knee 2017    Vertigo 09/10/2015    Restless leg syndrome        Allergies:Pcn [penicillins]    Current Outpatient Medications Ordered in Epic   Medication Sig Dispense Refill    albuterol (PROAIR HFA) 108 (90 Base) MCG/ACT Aero Soln inhalation aerosol Inhale 2 Puffs every 6 hours as needed for Shortness of Breath. 8.5 g 1     No current [facility]-ordered facility-administered medications on file.       Past Medical History:   Diagnosis Date    History of HPV infection     Restless leg syndrome     Vertigo        Social History     Tobacco Use    Smoking status: Never    Smokeless tobacco: Never   Vaping Use    Vaping Use: Never used   Substance Use Topics    Alcohol use: Yes     Alcohol/week: 1.2 - 1.8 oz     Types: 2 - 3 Glasses of wine per week     Comment: social once a month    Drug use: Not Currently       Family Status   Relation Name Status    Mo   at age 62    Fa          sepsis    Sis 1 Alive    Bro 2 Alive     Family History   Problem Relation  "Age of Onset    Cancer Mother         colon polyps,cervical    Cancer Father         lymphoma       ROS:  Review of Systems   Constitutional: Negative for fever, chills, weight loss, malaise, and fatigue.   HENT: Negative for ear pain, nosebleeds, congestion, sore throat and neck pain.    Eyes: Negative for vision changes.   Neuro: Negative for headache, sensory changes, weakness, seizure, LOC.   Cardiovascular: Negative for chest pain, palpitations, orthopnea and leg swelling.   Respiratory: Positive for shortness of breath.    Gastrointestinal: Positive for epigastric pain.   Negative for nausea, vomiting or diarrhea.   Genitourinary: Negative for dysuria, urgency and frequency.  Musculoskeletal: Negative for falls, neck pain, back pain, joint pain, myalgias.   Skin: Negative for rash, diaphoresis.     Exam:  /70   Pulse 90   Temp 36.7 °C (98 °F) (Temporal)   Resp 14   Ht 1.6 m (5' 3\")   Wt 59.3 kg (130 lb 12.8 oz)   SpO2 99%   General: well-nourished, well-developed female in NAD  Head: normocephalic, atraumatic  Eyes: PERRLA, no conjunctival injection, acuity grossly intact, lids normal.  Ears: normal shape and symmetry, no tenderness, no discharge. External canals are without any significant edema or erythema. Tympanic membranes are without any inflammation, no effusion. Gross auditory acuity is intact.  Nose: symmetrical without tenderness, no discharge.  Mouth/Throat: reasonable hygiene, no erythema, exudates or tonsillar enlargement.  Neck: no masses, range of motion within normal limits, no tracheal deviation. No obvious thyroid enlargement.   Lymph: no cervical adenopathy. No supraclavicular adenopathy.   Neuro: alert and oriented. Cranial nerves 1-12 grossly intact. No sensory deficit.   Cardiovascular: regular rate and rhythm. No edema.  Pulmonary: no distress. Chest is symmetrical with respiration, no wheezes, crackles, or rhonchi.   Abdomen: soft, epigastric tenderness, no guarding, no " hepatosplenomegaly.  Musculoskeletal: no clubbing, appropriate muscle tone, gait is stable.  Skin: warm, dry, intact, no clubbing, no cyanosis, no rashes.   Psych: appropriate mood, affect, judgement.         Assessment/Plan:  1. Gastroesophageal reflux disease, unspecified whether esophagitis present        2. Reactive airway disease without complication, unspecified asthma severity, unspecified whether persistent            The patient is a pleasant 46-year-old female who presents with both GI and respiratory complaints.  Her symptoms started with epigastric pain followed by shortness of breath and wheezing.  I suspect she has underlying GERD with RAD.  She is encouraged to continue a PPI.  If omeprazole is not tolerated, she may trial a different such as Nexium.  Proper dosing of medication discussed, including weaning.  May use albuterol as needed for reactive airway.  Reducing stress and inflammation advised.  Diet considerations discussed.  Supportive care, differential diagnoses, and indications for immediate follow-up discussed with patient.   Pathogenesis of diagnosis discussed including typical length and natural progression.   Instructed to return to clinic or nearest emergency department for any change in condition, further concerns, or worsening of symptoms.  Patient states understanding of the plan of care and discharge instructions.  Instructed to make an appointment, for follow up, with her primary care provider.        Please note that this dictation was created using voice recognition software. I have made every reasonable attempt to correct obvious errors, but I expect that there are errors of grammar and possibly content that I did not discover before finalizing the note. I spent a total of 30 minutes with record review, exam, communication with the patient, and documentation of this encounter. Previous clinic visit encounter reviewed and considered in medical decision making today.            TED Maxwell.

## 2022-11-01 ENCOUNTER — APPOINTMENT (OUTPATIENT)
Dept: MEDICAL GROUP | Facility: MEDICAL CENTER | Age: 47
End: 2022-11-01
Payer: COMMERCIAL

## 2022-12-12 DIAGNOSIS — K21.9 GASTROESOPHAGEAL REFLUX DISEASE, UNSPECIFIED WHETHER ESOPHAGITIS PRESENT: ICD-10-CM

## 2023-01-16 DIAGNOSIS — R19.8 GASTROINTESTINAL SYMPTOMS: ICD-10-CM

## 2023-01-17 ENCOUNTER — APPOINTMENT (OUTPATIENT)
Dept: MEDICAL GROUP | Facility: MEDICAL CENTER | Age: 48
End: 2023-01-17
Payer: COMMERCIAL

## 2023-01-24 ENCOUNTER — HOSPITAL ENCOUNTER (OUTPATIENT)
Dept: RADIOLOGY | Facility: MEDICAL CENTER | Age: 48
End: 2023-01-24
Attending: FAMILY MEDICINE
Payer: COMMERCIAL

## 2023-01-24 DIAGNOSIS — Z12.39 SCREENING BREAST EXAMINATION: ICD-10-CM

## 2023-01-24 PROCEDURE — 77063 BREAST TOMOSYNTHESIS BI: CPT

## 2023-01-30 ENCOUNTER — HOSPITAL ENCOUNTER (OUTPATIENT)
Facility: MEDICAL CENTER | Age: 48
End: 2023-01-30
Attending: FAMILY MEDICINE
Payer: COMMERCIAL

## 2023-01-30 ENCOUNTER — OFFICE VISIT (OUTPATIENT)
Dept: MEDICAL GROUP | Facility: MEDICAL CENTER | Age: 48
End: 2023-01-30
Payer: COMMERCIAL

## 2023-01-30 VITALS
TEMPERATURE: 97.8 F | OXYGEN SATURATION: 100 % | RESPIRATION RATE: 14 BRPM | DIASTOLIC BLOOD PRESSURE: 64 MMHG | HEIGHT: 63 IN | WEIGHT: 132.8 LBS | BODY MASS INDEX: 23.53 KG/M2 | SYSTOLIC BLOOD PRESSURE: 122 MMHG | HEART RATE: 62 BPM

## 2023-01-30 DIAGNOSIS — Z01.419 WELL WOMAN EXAM WITH ROUTINE GYNECOLOGICAL EXAM: ICD-10-CM

## 2023-01-30 DIAGNOSIS — Z00.00 LABORATORY EXAMINATION ORDERED AS PART OF A ROUTINE GENERAL MEDICAL EXAMINATION: ICD-10-CM

## 2023-01-30 PROBLEM — R10.13 EPIGASTRIC PAIN: Status: ACTIVE | Noted: 2023-01-30

## 2023-01-30 PROBLEM — K21.9 GASTRO-ESOPHAGEAL REFLUX DISEASE WITHOUT ESOPHAGITIS: Status: ACTIVE | Noted: 2023-01-30

## 2023-01-30 PROCEDURE — 99396 PREV VISIT EST AGE 40-64: CPT | Performed by: FAMILY MEDICINE

## 2023-01-30 PROCEDURE — 87624 HPV HI-RISK TYP POOLED RSLT: CPT

## 2023-01-30 PROCEDURE — 88175 CYTOPATH C/V AUTO FLUID REDO: CPT

## 2023-01-30 ASSESSMENT — ENCOUNTER SYMPTOMS
MYALGIAS: 0
NECK PAIN: 0
HEARTBURN: 1
NERVOUS/ANXIOUS: 0
WHEEZING: 0
BLURRED VISION: 0
DIARRHEA: 0
FOCAL WEAKNESS: 0
SENSORY CHANGE: 0
LOSS OF CONSCIOUSNESS: 0
COUGH: 0
SORE THROAT: 0
FEVER: 0
NAUSEA: 0
BACK PAIN: 0
DIAPHORESIS: 0
HEADACHES: 0
ABDOMINAL PAIN: 0
SHORTNESS OF BREATH: 0
PALPITATIONS: 0
DEPRESSION: 0
VOMITING: 0
SPEECH CHANGE: 0
ORTHOPNEA: 0
WEIGHT LOSS: 0
DIZZINESS: 0
TINGLING: 0
CHILLS: 0
DOUBLE VISION: 0
BRUISES/BLEEDS EASILY: 0
TREMORS: 0
BLOOD IN STOOL: 0
SPUTUM PRODUCTION: 0
SEIZURES: 0
WEAKNESS: 0

## 2023-01-30 ASSESSMENT — PATIENT HEALTH QUESTIONNAIRE - PHQ9: CLINICAL INTERPRETATION OF PHQ2 SCORE: 0

## 2023-01-30 NOTE — PROGRESS NOTES
Subjective     Mary Jane Hoffman is a 47 y.o. female who presents with Gynecologic Exam        She is here for her well woman examination with pap.    HPI     has a past medical history of History of HPV infection, Restless leg syndrome, and Vertigo.   has a past surgical history that includes other; tubal ligation; mammoplasty augmentation; pr breast augmentation with implant; and breast implant removal (Bilateral, 06/2019).  family history includes Cancer in her father and mother.   reports that she has never smoked. She has never used smokeless tobacco. She reports current alcohol use of about 1.2 - 1.8 oz per week. She reports that she does not currently use drugs.      Current Outpatient Medications:     Digestive Enzymes (DIGESTIVE ENZYME PO), Take  by mouth., Disp: , Rfl:   is allergic to pcn [penicillins].    Review of Systems   Constitutional:  Negative for chills, diaphoresis, fever, malaise/fatigue and weight loss.   HENT:  Negative for congestion, hearing loss, sore throat and tinnitus.    Eyes:  Negative for blurred vision and double vision.        Now has reading glasses   Respiratory:  Negative for cough, sputum production, shortness of breath and wheezing.    Cardiovascular:  Negative for chest pain, palpitations, orthopnea and leg swelling.   Gastrointestinal:  Positive for heartburn. Negative for abdominal pain, blood in stool, diarrhea, nausea and vomiting.        Episodic acid reflux, usually controlled by diet.   Genitourinary:  Negative for dysuria, frequency, hematuria and urgency.   Musculoskeletal:  Negative for back pain, joint pain, myalgias and neck pain.   Skin:  Negative for rash.   Neurological:  Negative for dizziness, tingling, tremors, sensory change, speech change, focal weakness, seizures, loss of consciousness, weakness and headaches.   Endo/Heme/Allergies:  Negative for environmental allergies. Does not bruise/bleed easily.   Psychiatric/Behavioral:  Negative for depression. The  "patient is not nervous/anxious.           Objective     /64 (BP Location: Right arm, Patient Position: Sitting, BP Cuff Size: Small adult)   Pulse 62   Temp 36.6 °C (97.8 °F) (Temporal)   Resp 14   Ht 1.6 m (5' 3\")   Wt 60.2 kg (132 lb 12.8 oz)   SpO2 100%   BMI 23.52 kg/m²      Physical Exam  Vitals reviewed.   Constitutional:       General: She is not in acute distress.     Appearance: She is well-developed.   HENT:      Head: Normocephalic and atraumatic.   Eyes:      General:         Left eye: No discharge.      Pupils: Pupils are equal, round, and reactive to light.   Neck:      Thyroid: No thyromegaly.      Vascular: No JVD.   Cardiovascular:      Rate and Rhythm: Normal rate and regular rhythm.      Heart sounds: Normal heart sounds. No murmur heard.  Pulmonary:      Effort: Pulmonary effort is normal. No respiratory distress.      Breath sounds: Normal breath sounds. No wheezing or rales.   Chest:   Breasts:     Breasts are symmetrical.      Right: No inverted nipple, mass, nipple discharge, skin change or tenderness.      Left: No inverted nipple, mass, nipple discharge, skin change or tenderness.   Abdominal:      General: Bowel sounds are normal. There is no distension.      Palpations: Abdomen is soft. There is no mass.      Tenderness: There is no abdominal tenderness.   Genitourinary:     Vagina: Normal. No vaginal discharge.      Cervix: No discharge.      Adnexa:         Right: No mass.          Left: No mass.        Comments: Pap smear (brush and spatula) of cervix taken and sent  Musculoskeletal:         General: Normal range of motion.      Cervical back: Normal range of motion and neck supple.   Lymphadenopathy:      Cervical: No cervical adenopathy.   Skin:     General: Skin is warm and dry.      Findings: No erythema or rash.   Neurological:      General: No focal deficit present.      Mental Status: She is alert and oriented to person, place, and time.      Coordination: " Coordination normal.   Psychiatric:         Mood and Affect: Mood normal.         Behavior: Behavior normal.            Assessment & Plan        1. Well woman exam with routine gynecological exam  She is generally well.  Await pap result.  - THINPREP PAP WITH HPV; Future    2. Laboratory examination ordered as part of a routine general medical examination  Routine orders discussed and placed  - Comp Metabolic Panel; Future  - Lipid Profile; Future  - TSH; Future  - CBC WITHOUT DIFFERENTIAL; Future     Recheck one year, sooner as needed

## 2023-01-31 DIAGNOSIS — Z01.419 WELL WOMAN EXAM WITH ROUTINE GYNECOLOGICAL EXAM: ICD-10-CM

## 2023-02-14 ENCOUNTER — HOSPITAL ENCOUNTER (OUTPATIENT)
Dept: LAB | Facility: MEDICAL CENTER | Age: 48
End: 2023-02-14
Attending: FAMILY MEDICINE
Payer: COMMERCIAL

## 2023-02-14 DIAGNOSIS — R19.8 GASTROINTESTINAL SYMPTOMS: ICD-10-CM

## 2023-02-14 DIAGNOSIS — Z00.00 LABORATORY EXAMINATION ORDERED AS PART OF A ROUTINE GENERAL MEDICAL EXAMINATION: ICD-10-CM

## 2023-02-14 LAB
ALBUMIN SERPL BCP-MCNC: 4.7 G/DL (ref 3.2–4.9)
ALBUMIN/GLOB SERPL: 1.6 G/DL
ALP SERPL-CCNC: 92 U/L (ref 30–99)
ALT SERPL-CCNC: 16 U/L (ref 2–50)
ANION GAP SERPL CALC-SCNC: 10 MMOL/L (ref 7–16)
AST SERPL-CCNC: 22 U/L (ref 12–45)
BILIRUB SERPL-MCNC: 0.4 MG/DL (ref 0.1–1.5)
BUN SERPL-MCNC: 9 MG/DL (ref 8–22)
CALCIUM ALBUM COR SERPL-MCNC: 8.9 MG/DL (ref 8.5–10.5)
CALCIUM SERPL-MCNC: 9.5 MG/DL (ref 8.5–10.5)
CHLORIDE SERPL-SCNC: 102 MMOL/L (ref 96–112)
CO2 SERPL-SCNC: 26 MMOL/L (ref 20–33)
CREAT SERPL-MCNC: 0.78 MG/DL (ref 0.5–1.4)
ERYTHROCYTE [DISTWIDTH] IN BLOOD BY AUTOMATED COUNT: 39.7 FL (ref 35.9–50)
GFR SERPLBLD CREATININE-BSD FMLA CKD-EPI: 94 ML/MIN/1.73 M 2
GLOBULIN SER CALC-MCNC: 3 G/DL (ref 1.9–3.5)
GLUCOSE SERPL-MCNC: 81 MG/DL (ref 65–99)
HCT VFR BLD AUTO: 40 % (ref 37–47)
HGB BLD-MCNC: 13 G/DL (ref 12–16)
MCH RBC QN AUTO: 30 PG (ref 27–33)
MCHC RBC AUTO-ENTMCNC: 32.5 G/DL (ref 33.6–35)
MCV RBC AUTO: 92.2 FL (ref 81.4–97.8)
PLATELET # BLD AUTO: 329 K/UL (ref 164–446)
PMV BLD AUTO: 11 FL (ref 9–12.9)
POTASSIUM SERPL-SCNC: 4.2 MMOL/L (ref 3.6–5.5)
PROT SERPL-MCNC: 7.7 G/DL (ref 6–8.2)
RBC # BLD AUTO: 4.34 M/UL (ref 4.2–5.4)
SODIUM SERPL-SCNC: 138 MMOL/L (ref 135–145)
TSH SERPL DL<=0.005 MIU/L-ACNC: 2.43 UIU/ML (ref 0.38–5.33)
WBC # BLD AUTO: 6 K/UL (ref 4.8–10.8)

## 2023-02-14 PROCEDURE — 84443 ASSAY THYROID STIM HORMONE: CPT

## 2023-02-14 PROCEDURE — 85027 COMPLETE CBC AUTOMATED: CPT

## 2023-02-14 PROCEDURE — 80053 COMPREHEN METABOLIC PANEL: CPT

## 2023-02-14 PROCEDURE — 36415 COLL VENOUS BLD VENIPUNCTURE: CPT

## 2023-04-04 DIAGNOSIS — M62.838 MUSCLE SPASM: ICD-10-CM

## 2023-04-04 RX ORDER — CYCLOBENZAPRINE HCL 5 MG
5 TABLET ORAL 3 TIMES DAILY PRN
Qty: 30 TABLET | Refills: 0 | Status: SHIPPED | OUTPATIENT
Start: 2023-04-04 | End: 2023-09-13

## 2023-09-13 DIAGNOSIS — M62.838 MUSCLE SPASM: ICD-10-CM

## 2023-09-13 RX ORDER — CYCLOBENZAPRINE HCL 5 MG
TABLET ORAL
Qty: 30 TABLET | Refills: 0 | Status: SHIPPED | OUTPATIENT
Start: 2023-09-13

## 2023-09-25 ENCOUNTER — APPOINTMENT (OUTPATIENT)
Dept: MEDICAL GROUP | Facility: MEDICAL CENTER | Age: 48
End: 2023-09-25
Payer: COMMERCIAL

## 2023-10-02 ENCOUNTER — APPOINTMENT (OUTPATIENT)
Dept: MEDICAL GROUP | Facility: MEDICAL CENTER | Age: 48
End: 2023-10-02
Payer: COMMERCIAL

## 2023-10-16 ENCOUNTER — APPOINTMENT (OUTPATIENT)
Dept: MEDICAL GROUP | Facility: MEDICAL CENTER | Age: 48
End: 2023-10-16
Payer: COMMERCIAL

## 2025-06-17 ENCOUNTER — OFFICE VISIT (OUTPATIENT)
Dept: URGENT CARE | Facility: CLINIC | Age: 50
End: 2025-06-17

## 2025-06-17 VITALS
SYSTOLIC BLOOD PRESSURE: 110 MMHG | TEMPERATURE: 97.8 F | BODY MASS INDEX: 23.66 KG/M2 | DIASTOLIC BLOOD PRESSURE: 66 MMHG | WEIGHT: 138.6 LBS | RESPIRATION RATE: 16 BRPM | HEART RATE: 72 BPM | OXYGEN SATURATION: 97 % | HEIGHT: 64 IN

## 2025-06-17 DIAGNOSIS — S61.311A LACERATION OF LEFT INDEX FINGER WITHOUT FOREIGN BODY WITH DAMAGE TO NAIL, INITIAL ENCOUNTER: ICD-10-CM

## 2025-06-17 PROCEDURE — 12002 RPR S/N/AX/GEN/TRNK2.6-7.5CM: CPT | Performed by: NURSE PRACTITIONER

## 2025-06-17 PROCEDURE — 90714 TD VACC NO PRESV 7 YRS+ IM: CPT | Mod: JZ | Performed by: NURSE PRACTITIONER

## 2025-06-17 PROCEDURE — 90471 IMMUNIZATION ADMIN: CPT | Performed by: NURSE PRACTITIONER

## 2025-06-17 RX ORDER — CEPHALEXIN 500 MG/1
500 CAPSULE ORAL 4 TIMES DAILY
Qty: 28 CAPSULE | Refills: 0 | Status: SHIPPED | OUTPATIENT
Start: 2025-06-17 | End: 2025-06-24

## 2025-06-17 ASSESSMENT — ENCOUNTER SYMPTOMS
CONSTITUTIONAL NEGATIVE: 1
NEUROLOGICAL NEGATIVE: 1
SENSORY CHANGE: 0
WEAKNESS: 0

## 2025-06-17 ASSESSMENT — VISUAL ACUITY: OU: 1

## 2025-06-17 NOTE — PROGRESS NOTES
"Subjective:     Mary Jane Hoffman is a 49 y.o. female who presents for Laceration (LT finger laceration happened on Sunday, pt is taking left over antibiotics (clindamycin 300 mg) )       Laceration   The laceration is located on the Left hand.      present who also provides history.    On Sunday, patient was on a camping.  Accidentally cut left index finger using a hatchet.  Laceration goes through the nail plate and nailbed.  Performed initial wound care.  Patient reports flap of tissue which is still attached.    Tdap received 8/13/2014 per chart review.    Review of Systems   Constitutional: Negative.    Neurological: Negative.  Negative for sensory change and weakness.   All other systems reviewed and are negative.    Refer to HPI for additional details.    During this visit, appropriate PPE was worn, and hand hygiene was performed.    PMH:  has a past medical history of History of HPV infection, Restless leg syndrome, and Vertigo.    MEDS: Current Medications[1]    ALLERGIES: Allergies[2]  SURGHX: Past Surgical History[3]  SOCHX:  reports that she has never smoked. She has never used smokeless tobacco. She reports current alcohol use of about 1.2 - 1.8 oz of alcohol per week. She reports that she does not currently use drugs.    FH: Per HPI as applicable/pertinent.      Objective:     /66 (BP Location: Left arm, Patient Position: Sitting, BP Cuff Size: Adult)   Pulse 72   Temp 36.6 °C (97.8 °F) (Temporal)   Resp 16   Ht 1.613 m (5' 3.5\")   Wt 62.9 kg (138 lb 9.6 oz)   SpO2 97%   BMI 24.17 kg/m²     Physical Exam  Nursing note reviewed.   Constitutional:       General: She is not in acute distress.     Appearance: She is well-developed. She is not ill-appearing or toxic-appearing.   Eyes:      General: Vision grossly intact.   Cardiovascular:      Rate and Rhythm: Normal rate.   Pulmonary:      Effort: Pulmonary effort is normal. No respiratory distress.   Musculoskeletal:         General: No " deformity. Normal range of motion.      Left hand: Laceration present. No swelling, deformity or tenderness. Normal range of motion. Normal strength. Normal sensation. Normal capillary refill.        Hands:       Comments: Approx 3 cm full thickness laceration to distal left 2nd finger dorsal aspect, through nail plate and nail bed; distal NVI including attached tissue   Skin:     General: Skin is warm and dry.      Capillary Refill: Capillary refill takes less than 2 seconds.      Coloration: Skin is not pale.   Neurological:      Mental Status: She is alert and oriented to person, place, and time.      Motor: No weakness.   Psychiatric:         Behavior: Behavior normal. Behavior is cooperative.       Assessment/Plan:     1. Laceration of left index finger without foreign body with damage to nail, initial encounter  - TD Preservative Free =>8yo IM  - cephALEXin (KEFLEX) 500 MG Cap; Take 1 Capsule by mouth 4 times a day for 7 days.  Dispense: 28 Capsule; Refill: 0    Procedure: Laceration Repair of Left Index Finger  - Risks, benefits, methods, and alternatives of primary wound closure reviewed. Risks including bleeding, nerve damage, infection, and poor cosmetic outcome discussed at length.  - Verbal consent received from patient to proceed with laceration repair given risks for better wound healing and to preserve viable tissue.  - Wound length 3 cm, location left index finger as described above, subcutaneous tissue visible, NVI, no visible tendon or bone.  - Area copiously irrigated with NS; no foreign bodies identified.  - Area soaked and cleansed with diluted chlorhexidine solution.  - Clean technique with sterile instruments.  - Digital block of the left index finger achieved with 4 mL of 1% lidocaine without epinephrine.  - Applied #6 interrupted sutures to skin with 4.0 Ethilon; good wound edge approximation achieved.  - Two layers of Dermabond applied to nail plate to hold keep nail plate and underlying  nail bed approximated.  - Irrigated copiously with NS.  - Minimal bleeding with good hemostasis achieved.   - Non-adhesive dressing applied.  - There were no procedural complications.  - Patient tolerated procedure well.  - Tetanus updated.    Advised basic skin care with mild soapy water and dressing changes at least once daily. May briefly wet repair, but do not soak. Rx as above sent electronically; prophylactic Keflex. Monitor for signs/symptoms of infection. Advise suture removal in about 10 days.    Differential diagnosis, natural history, supportive care, over-the-counter symptom management per 's instructions, close monitoring, and indications for immediate follow-up discussed.     All questions answered. Patient/ agrees with the plan of care.       [1]   Current Outpatient Medications:     cephALEXin (KEFLEX) 500 MG Cap, Take 1 Capsule by mouth 4 times a day for 7 days., Disp: 28 Capsule, Rfl: 0  [2]   Allergies  Allergen Reactions    Pcn [Penicillins]    [3]   Past Surgical History:  Procedure Laterality Date    BREAST IMPLANT REMOVAL Bilateral 06/2019    Dr. Huddleston    MAMMOPLASTY AUGMENTATION      OTHER      leep    AK BREAST AUGMENTATION WITH IMPLANT      TUBAL LIGATION

## 2025-06-23 ENCOUNTER — OFFICE VISIT (OUTPATIENT)
Dept: URGENT CARE | Facility: CLINIC | Age: 50
End: 2025-06-23

## 2025-06-23 ENCOUNTER — APPOINTMENT (OUTPATIENT)
Dept: RADIOLOGY | Facility: IMAGING CENTER | Age: 50
End: 2025-06-23

## 2025-06-23 ENCOUNTER — HOSPITAL ENCOUNTER (EMERGENCY)
Facility: MEDICAL CENTER | Age: 50
End: 2025-06-23

## 2025-06-23 VITALS
OXYGEN SATURATION: 98 % | HEART RATE: 64 BPM | BODY MASS INDEX: 23.56 KG/M2 | WEIGHT: 138 LBS | DIASTOLIC BLOOD PRESSURE: 60 MMHG | SYSTOLIC BLOOD PRESSURE: 108 MMHG | RESPIRATION RATE: 16 BRPM | TEMPERATURE: 97.9 F | HEIGHT: 64 IN

## 2025-06-23 DIAGNOSIS — S61.311D LACERATION OF LEFT INDEX FINGER WITH DAMAGE TO NAIL, FOREIGN BODY PRESENCE UNSPECIFIED, SUBSEQUENT ENCOUNTER: ICD-10-CM

## 2025-06-23 DIAGNOSIS — S61.311D LACERATION OF LEFT INDEX FINGER WITH DAMAGE TO NAIL, FOREIGN BODY PRESENCE UNSPECIFIED, SUBSEQUENT ENCOUNTER: Primary | ICD-10-CM

## 2025-06-23 PROBLEM — S68.119A TRAUMATIC AMPUTATION OF FINGER TIP: Status: ACTIVE | Noted: 2025-06-23

## 2025-06-23 PROCEDURE — 73140 X-RAY EXAM OF FINGER(S): CPT | Mod: TC,LT | Performed by: RADIOLOGY

## 2025-06-23 ASSESSMENT — ENCOUNTER SYMPTOMS
NAUSEA: 0
DIARRHEA: 0
COUGH: 0
VOMITING: 0
WEAKNESS: 0
SHORTNESS OF BREATH: 0
FEVER: 0
DIZZINESS: 0

## 2025-06-23 NOTE — PROGRESS NOTES
"Subjective     Mary Jane Hoffman is a 49 y.o. female who presents with Wound Infection (Pt was here for sutures on 6/17 and she is having pain)            Mary Jane is here today with her  after she hit her left index finger with hatchet camping 8 days ago.  Presented to urgent care on 6/17/2025 skin flap was reattached with 6 sutures and Dermabond was applied to attach her nail.  She was also started on a 7-day course of cephalexin however she admits that she is only been taking 3 doses a day instead of the ordered for.  She notes that the skin flap has not become black and she is starting to have some redness on the rest of her finger. She states that his numbness and tingling in her left index finger.  She denies any fever.        Review of Systems   Constitutional:  Negative for fever and malaise/fatigue.   HENT:  Negative for congestion.    Respiratory:  Negative for cough and shortness of breath.    Cardiovascular:  Negative for chest pain.   Gastrointestinal:  Negative for diarrhea, nausea and vomiting.   Skin:  Negative for rash.   Neurological:  Negative for dizziness and weakness.   All other systems reviewed and are negative.             Objective     /60 (BP Location: Left arm, Patient Position: Sitting, BP Cuff Size: Adult)   Pulse 64   Temp 36.6 °C (97.9 °F) (Temporal)   Resp 16   Ht 1.613 m (5' 3.5\")   Wt 62.6 kg (138 lb)   SpO2 98%   BMI 24.06 kg/m²      Physical Exam  Vitals reviewed.   Constitutional:       Appearance: Normal appearance.   HENT:      Head: Normocephalic.      Nose: Nose normal.   Cardiovascular:      Rate and Rhythm: Normal rate.   Pulmonary:      Effort: Pulmonary effort is normal.   Musculoskeletal:         General: Normal range of motion.        Hands:       Comments: Skin flap on pad of left index finger purple/black with no cap refill. Mild erythema extending to PIP   Skin:     General: Skin is warm and dry.   Neurological:      Mental Status: She is alert and " oriented to person, place, and time.   Psychiatric:         Behavior: Behavior normal.                           6/23/2025 8:36 AM     HISTORY/REASON FOR EXAM:  Pain/Deformity Following Trauma.  .     TECHNIQUE/EXAM DESCRIPTION AND NUMBER OF VIEWS:  3 views of the LEFT fingers.     COMPARISON: None     FINDINGS:  There is no fracture or dislocation.  The visualized osseous structures are in anatomic alignment.  The joint spaces are grossly preserved.  Bone mineralization is age-appropriate..        IMPRESSION:     No acute osseous abnormality.       Assessment & Plan  Laceration of left index finger with damage to nail, foreign body presence unspecified, subsequent encounter    Orders:    DX-FINGER(S) 2+ LEFT; Future       No fracture or dislocation noted on x-ray.    Discussed that she will need to present to the ER for further workup and management.    Shared decision-making was utilized with Mary Jane for plan of care. Discussed differential diagnoses, natural history, and supportive care. Reviewed indication for use and the potential benefits and side effects of medications. Mary Jane was encouraged to monitor symptoms closely and educated about signs and symptoms that would warrant concern and mandate seeking a higher level of service through the emergency department discussed at length. All questions answered to Mary Jane's satisfaction and they were in agreement with treatment plan at time of discharge.

## 2025-06-24 ENCOUNTER — TELEPHONE (OUTPATIENT)
Dept: HEALTH INFORMATION MANAGEMENT | Facility: OTHER | Age: 50
End: 2025-06-24